# Patient Record
Sex: FEMALE | Race: WHITE | URBAN - METROPOLITAN AREA
[De-identification: names, ages, dates, MRNs, and addresses within clinical notes are randomized per-mention and may not be internally consistent; named-entity substitution may affect disease eponyms.]

---

## 2020-01-23 ENCOUNTER — OFFICE VISIT (OUTPATIENT)
Dept: ORTHOPEDIC SURGERY | Age: 40
End: 2020-01-23
Payer: COMMERCIAL

## 2020-01-23 VITALS — WEIGHT: 215 LBS | HEIGHT: 67 IN | BODY MASS INDEX: 33.74 KG/M2

## 2020-01-23 PROCEDURE — 99203 OFFICE O/P NEW LOW 30 MIN: CPT | Performed by: ORTHOPAEDIC SURGERY

## 2020-01-23 NOTE — PROGRESS NOTES
Chief Complaint    Shoulder Pain (RIGHT anterior/lateral shoulder pain with some posterior shoulder pain)      History of Present Illness:  Leonard Schneider is a 44 y.o. female presents the office today for a new problem. Patient sent in orthopedic consultation per Dr. Mikie Mcqueen. Patient is here with a chief complaint of right anterior and lateral shoulder pain. She has more mild posterior shoulder pain. Patient has had no recent injury or trauma. She does have a history of bull riding in the past.  She has tried conservative measures of ice, rest, and over-the-counter medications without significant relief. She denies any cervical pain or upper extremity radicular symptoms. Pain Assessment  Location of Pain: Shoulder  Location Modifiers: Right, Anterior, Posterior  Severity of Pain: 8  Quality of Pain: Sharp, Dull, Aching  Duration of Pain: Persistent  Frequency of Pain: Constant  Aggravating Factors: Bending, Stretching, Straightening  Limiting Behavior: Some  Relieving Factors: Rest, Nsaids    Medical History:  Past Medical History:   Diagnosis Date    Hip pain     left    Nausea & vomiting     Rhinitis     Sinus infection      There are no active problems to display for this patient.     Past Surgical History:   Procedure Laterality Date    FOOT SURGERY      rt foot foreign object removed    HAND SURGERY      rt hand and lt wrist-ganglion removed    HIP SURGERY  9-    Left hip steroid injection, arthrogram    LEG SURGERY      david legs-interal pronation as a toddler    TONSILLECTOMY       Family History   Problem Relation Age of Onset    Arthritis Mother     Depression Mother     Heart Disease Maternal Grandmother     Diabetes Maternal Grandmother     Heart Disease Maternal Grandfather      Social History     Socioeconomic History    Marital status: Single     Spouse name: None    Number of children: None    Years of education: None    Highest education level: None Occupational History    None   Social Needs    Financial resource strain: None    Food insecurity:     Worry: None     Inability: None    Transportation needs:     Medical: None     Non-medical: None   Tobacco Use    Smoking status: Former Smoker    Smokeless tobacco: Never Used    Tobacco comment: quit in 2008   Substance and Sexual Activity    Alcohol use: No    Drug use: No    Sexual activity: None   Lifestyle    Physical activity:     Days per week: None     Minutes per session: None    Stress: None   Relationships    Social connections:     Talks on phone: None     Gets together: None     Attends [employer] service: None     Active member of club or organization: None     Attends meetings of clubs or organizations: None     Relationship status: None    Intimate partner violence:     Fear of current or ex partner: None     Emotionally abused: None     Physically abused: None     Forced sexual activity: None   Other Topics Concern    None   Social History Narrative    None     Current Outpatient Medications   Medication Sig Dispense Refill    TURMERIC PO Take 1,000 mg by mouth      Flax OIL Take by mouth      vitamin D (ERGOCALCIFEROL) 16036 UNIT CAPS capsule Take 2,000 Units by mouth daily.  fish oil-omega-3 fatty acids 1000 MG capsule Take 2 g by mouth daily.  Garlic 230 MG TABS Take  by mouth.  fexofenadine (ALLEGRA ALLERGY) 180 MG tablet Take 180 mg by mouth daily.  dextromethorphan-guaifenesin (MUCINEX DM)  MG per SR tablet Take 1 tablet by mouth every 12 hours as needed.  Aspirin-Acetaminophen-Caffeine (EXCEDRIN PO) Take 1 tablet by mouth every 4 hours as needed.  calcium carbonate (OSCAL) 500 MG TABS tablet Take 1,000 mg by mouth daily. No current facility-administered medications for this visit.         Review of Systems:  Relevant review of systems reviewed and available in the patient's chart    Vital Signs:  Ht 5' 7.01\" (1.702 m)   Wt 215 lb (97.5 kg)   BMI 33.67 kg/m²     General Exam:   Constitutional: Patient is adequately groomed with no evidence of malnutrition  DTRs: Deep tendon reflexes are intact  Mental Status: The patient is oriented to time, place and person. The patient's mood and affect are appropriate. Lymphatic: The lymphatic examination bilaterally reveals all areas to be without enlargement or induration. Vascular: Examination reveals no swelling or calf tenderness. Peripheral pulses are palpable and 2+. Neurological: The patient has good coordination. There is no weakness or sensory deficit. Right shoulder Examination:    Inspection:  No rashes, scars, or lesions. No deformity or atrophy. Palpation: No tenderness at the [facility] joint. Moderate tenderness over the bicipital groove    Range of Motion: 180 degrees of forward flexion, 7 degrees of external rotation, internal rotation L2    Strength: 5/5 strength with internal and external rotation. Negative belly press sign. 3+/5 strength with abduction. Biceps and triceps strength is 5/5. Special Tests:  Positive Waters and Neer impingement exam.  Negative speed sign. Negative crossover examination. Skin: There are no rashes, ulcerations or lesions. Gait: Normal gait pattern    Reflex normal deep tendon reflexes    Additional Comments:       Additional Examinations:         Contralateral Exam: Examination of the left shoulder reveals no atrophy or deformity. Skin is warm and dry. Range of motion is within normal limits. There is no focal tenderness with palpation. No AC joint tenderness. Negative Neer and Waters-Petros exams. Strength is graded 5/5 throughout. Neck: Examination of the neck does not show any tenderness, deformity or injury. Range of motion is unremarkable. There is no gross instability. There are no rashes, ulcerations or lesions.   Strength and tone are normal.    Radiology:     X-rays obtained and reviewed in office:  Views

## 2020-02-06 ENCOUNTER — OFFICE VISIT (OUTPATIENT)
Dept: ORTHOPEDIC SURGERY | Age: 40
End: 2020-02-06
Payer: COMMERCIAL

## 2020-02-06 VITALS — WEIGHT: 214.95 LBS | HEIGHT: 67 IN | BODY MASS INDEX: 33.74 KG/M2

## 2020-02-06 PROCEDURE — 99213 OFFICE O/P EST LOW 20 MIN: CPT | Performed by: ORTHOPAEDIC SURGERY

## 2020-02-06 RX ORDER — BETAMETHASONE SODIUM PHOSPHATE AND BETAMETHASONE ACETATE 3; 3 MG/ML; MG/ML
12 INJECTION, SUSPENSION INTRA-ARTICULAR; INTRALESIONAL; INTRAMUSCULAR; SOFT TISSUE ONCE
Status: COMPLETED | OUTPATIENT
Start: 2020-02-06 | End: 2020-02-06

## 2020-02-06 RX ADMIN — BETAMETHASONE SODIUM PHOSPHATE AND BETAMETHASONE ACETATE 12 MG: 3; 3 INJECTION, SUSPENSION INTRA-ARTICULAR; INTRALESIONAL; INTRAMUSCULAR; SOFT TISSUE at 09:58

## 2020-02-06 NOTE — PROGRESS NOTES
Administrations This Visit     betamethasone acetate-betamethasone sodium phosphate (CELESTONE) injection 12 mg     Admin Date  02/06/2020  09:58 Action  Given Dose  12 mg Route  Intra-articular Site  Shoulder Right Administered By  Darrell Benites MD    Ordering Provider:  Darrell Benites MD    [address]:  [id number]    Lot#:  [id number]    :  [employer]    Patient Supplied?:  No             Bupivacaine 250mg/50mL  3 cc  Lot # [id number]  Exp  02/01/2021  NDC# 9807-6364-26

## 2020-02-06 NOTE — PROGRESS NOTES
Chief Complaint    Follow-up (TR MRI R SHOULDER)      History of Present Illness:  Mulu Zarco is a 44 y.o. female returns today for follow-up regarding her right shoulder after undergoing an MRI. Her symptoms are essentially unchanged. She continues to report pain in the shoulder with movement especially with overhead activity. The results of the MRI and case some moderate tendinosis and peritendinous bursitis of the rotator cuff without rotator cuff tear. There is also some traction related edema within the proximal humerus consistent with impingement. Pain Assessment  Location of Pain: Shoulder  Location Modifiers: Right  Severity of Pain: 3  Quality of Pain: Aching  Duration of Pain: Persistent  Frequency of Pain: Constant  Aggravating Factors: Bending, Stretching, Straightening, Exercise  Limiting Behavior: Yes  Result of Injury: No  Work-Related Injury: No  Are there other pain locations you wish to document?: No    Medical History:  Past Medical History:   Diagnosis Date    Hip pain     left    Nausea & vomiting     Rhinitis     Sinus infection      There are no active problems to display for this patient.     Past Surgical History:   Procedure Laterality Date    FOOT SURGERY      rt foot foreign object removed    HAND SURGERY      rt hand and lt wrist-ganglion removed    HIP SURGERY  9-    Left hip steroid injection, arthrogram    LEG SURGERY      david legs-interal pronation as a toddler    TONSILLECTOMY       Family History   Problem Relation Age of Onset    Arthritis Mother     Depression Mother     Heart Disease Maternal Grandmother     Diabetes Maternal Grandmother     Heart Disease Maternal Grandfather      Social History     Socioeconomic History    Marital status: Single     Spouse name: None    Number of children: None    Years of education: None    Highest education level: None   Occupational History    None   Social Needs    Financial resource strain: None    Food insecurity:     Worry: None     Inability: None    Transportation needs:     Medical: None     Non-medical: None   Tobacco Use    Smoking status: Former Smoker    Smokeless tobacco: Never Used    Tobacco comment: quit in 2008   Substance and Sexual Activity    Alcohol use: No    Drug use: No    Sexual activity: None   Lifestyle    Physical activity:     Days per week: None     Minutes per session: None    Stress: None   Relationships    Social connections:     Talks on phone: None     Gets together: None     Attends Muslim service: None     Active member of club or organization: None     Attends meetings of clubs or organizations: None     Relationship status: None    Intimate partner violence:     Fear of current or ex partner: None     Emotionally abused: None     Physically abused: None     Forced sexual activity: None   Other Topics Concern    None   Social History Narrative    None     Current Outpatient Medications   Medication Sig Dispense Refill    TURMERIC PO Take 1,000 mg by mouth      Flax OIL Take by mouth      vitamin D (ERGOCALCIFEROL) 63453 UNIT CAPS capsule Take 2,000 Units by mouth daily.  calcium carbonate (OSCAL) 500 MG TABS tablet Take 1,000 mg by mouth daily.  fish oil-omega-3 fatty acids 1000 MG capsule Take 2 g by mouth daily.  Garlic 620 MG TABS Take  by mouth.  fexofenadine (ALLEGRA ALLERGY) 180 MG tablet Take 180 mg by mouth daily.  dextromethorphan-guaifenesin (MUCINEX DM)  MG per SR tablet Take 1 tablet by mouth every 12 hours as needed.  Aspirin-Acetaminophen-Caffeine (EXCEDRIN PO) Take 1 tablet by mouth every 4 hours as needed. No current facility-administered medications for this visit.         Review of Systems:  Relevant review of systems reviewed and available in the patient's chart    Vital Signs:  Ht 5' 7.01\" (1.702 m)   Wt 214 lb 15.2 oz (97.5 kg)   BMI 33.66 kg/m²     General Exam:   Constitutional: Patient is adequately groomed with no evidence of malnutrition  DTRs: Deep tendon reflexes are intact  Mental Status: The patient is oriented to time, place and person. The patient's mood and affect are appropriate. Lymphatic: The lymphatic examination bilaterally reveals all areas to be without enlargement or induration. Vascular: Examination reveals no swelling or calf tenderness. Peripheral pulses are palpable and 2+. Neurological: The patient has good coordination. There is no weakness or sensory deficit. Right shoulder Examination:    Inspection:  No rashes, scars, or lesions. No deformity or atrophy. Palpation: No tenderness at the [facility] joint. Moderate tenderness over the bicipital groove    Range of Motion: 180 degrees of forward flexion, 7 degrees of external rotation, internal rotation L2    Strength: 5/5 strength with internal and external rotation. Negative belly press sign. 3+/5 strength with abduction. Biceps and triceps strength is 5/5. Special Tests:  Positive Waters and Neer impingement exam.  Negative speed sign. Negative crossover examination. Skin: There are no rashes, ulcerations or lesions. Gait: Normal gait pattern    Reflex normal deep tendon reflexes      Radiology:     FINDINGS:  AC arthropathy is mild.  Shallow subacromial spur.       Moderate tendinosis supraspinatus and less so infraspinatus insertions.  Mild    peritendinobursitis.  Pseudocysts and marrow reaction greater tuberosity.       Biceps long head tendon is intact.       No labrum tear.  Small effusion.  No soft tissue mass.           CONCLUSION:   1. Moderate tendinosis supraspinatus insertion. Mild peritendinobursitis.  Underlying    pseudocysts and marrow reaction related to chronic traction and/or impingement. 2. Broad shallow subacromial spur. Lateral arch narrowing. 3. Please see above. Impression:  Encounter Diagnosis   Name Primary?     Shoulder impingement syndrome, right Yes Office Procedures:  No orders of the defined types were placed in this encounter. Procedure: Right shoulder subacromial injection  I discussed in detail the risk, benefits, and complications of an injection which included but are not limited to infection, skin reactions, hot swollen joints, and anaphylaxis with the patient. The patient verbalized good understanding and gave informed consent for the right shoulder injection. The skin was prepped using sterile alcohol solution. A sterile 22-gauge needle was inserted into the subacromial space and a mixture of 2ml Beta-Beta, 3 mL of 0.5% bupivacaine, was injected under sterile technique. The needle was withdrawn and the puncture site sealed with a Band-Aid. Technique: Under sterile conditions a SonDotBlu ultrasound unit with a variable frequency (6.0-15.0 MHz) linear transducer was used to localize the placement of a 22-gauge needle into the subacromial space. Findings: Successful needle placement for  subacromial space injection. Final images were taken and saved for permanent record. The patient tolerated the injection well. The patient was instructed to call the office immediately if there is any pain, redness, warmth, fever, or chills. Treatment Plan: Today we have gone over the diagnosis and the recommendations for treatment. Reassured the patient that there is no need for any surgical intervention. I would recommend a right shoulder subacromial injection and referral to outpatient physical therapy. She will continue with oral analgesics over-the-counter.   Follow-up with us as needed

## 2020-02-10 ENCOUNTER — TELEPHONE (OUTPATIENT)
Dept: ORTHOPEDIC SURGERY | Age: 40
End: 2020-02-10

## 2020-02-10 NOTE — LETTER
[facility] [address] [address]  Phone: [phone]  Fax: [phone]      February 12, 2020     Patient: Lamont Herring   YOB: 1980       To Whom it May Concern:    Lamont Herring was seen in my clinic on 2/6/20. At this time the patient does not have any restrictions may return to full duty. If you have any questions or concerns, please don't hesitate to call.     Sincerely,

## 2020-02-11 NOTE — TELEPHONE ENCOUNTER
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 What are her restrictions and how long? 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 Can she lift patients?

## 2020-02-11 NOTE — TELEPHONE ENCOUNTER
Her MRI showed no evidence of any rotator cuff tearing, just impingement syndrome. She really has no restrictions other than allowing pain to be her guide.   Just

## 2023-01-31 ENCOUNTER — OFFICE (OUTPATIENT)
Dept: URBAN - METROPOLITAN AREA CLINIC 18 | Facility: CLINIC | Age: 43
End: 2023-01-31

## 2023-01-31 VITALS
HEART RATE: 90 BPM | SYSTOLIC BLOOD PRESSURE: 136 MMHG | HEIGHT: 67 IN | WEIGHT: 226 LBS | DIASTOLIC BLOOD PRESSURE: 84 MMHG

## 2023-01-31 DIAGNOSIS — Z87.19 PERSONAL HISTORY OF OTHER DISEASES OF THE DIGESTIVE SYSTEM: ICD-10-CM

## 2023-01-31 DIAGNOSIS — R10.30 LOWER ABDOMINAL PAIN, UNSPECIFIED: ICD-10-CM

## 2023-01-31 DIAGNOSIS — K62.5 HEMORRHAGE OF ANUS AND RECTUM: ICD-10-CM

## 2023-01-31 PROCEDURE — 99204 OFFICE O/P NEW MOD 45 MIN: CPT | Performed by: INTERNAL MEDICINE

## 2023-02-01 LAB
CBC, PLATELET CT  AND  DIFF: ABS BASOPHIL: 0.1 K/UL
CBC, PLATELET CT  AND  DIFF: ABS EOSINOPHIL: 0.2 K/UL
CBC, PLATELET CT  AND  DIFF: ABS IMMATURE GRANS: 0 K/UL
CBC, PLATELET CT  AND  DIFF: ABS LYMPHOCYTE: 1.8 K/UL
CBC, PLATELET CT  AND  DIFF: ABS MONOCYTE: 0.7 K/UL
CBC, PLATELET CT  AND  DIFF: ABS NEUTROPHIL: 4.1 K/UL
CBC, PLATELET CT  AND  DIFF: BASOPHIL: 0.9 %
CBC, PLATELET CT  AND  DIFF: DIFFERENTIAL: (no result)
CBC, PLATELET CT  AND  DIFF: EOSINOPHIL: 2.8 %
CBC, PLATELET CT  AND  DIFF: HEMATOCRIT: 43.2 %
CBC, PLATELET CT  AND  DIFF: HEMOGLOBIN: 14.6 G/DL
CBC, PLATELET CT  AND  DIFF: IMMATURE GRANULOCYTES: 0.4 %
CBC, PLATELET CT  AND  DIFF: LYMPHOCYTE: 25.7 %
CBC, PLATELET CT  AND  DIFF: MCH: 27.9 PG
CBC, PLATELET CT  AND  DIFF: MCHC: 33.8 G/DL
CBC, PLATELET CT  AND  DIFF: MCV: 82.6 FL
CBC, PLATELET CT  AND  DIFF: MONOCYTE: 10.3 %
CBC, PLATELET CT  AND  DIFF: MPV: 10.2 FL
CBC, PLATELET CT  AND  DIFF: NEUTROPHIL: 59.9 %
CBC, PLATELET CT  AND  DIFF: NRBCS: 0 /100 WBC
CBC, PLATELET CT  AND  DIFF: PLATELET COUNT: 289 K/UL
CBC, PLATELET CT  AND  DIFF: RBC: 5.23 M/UL
CBC, PLATELET CT  AND  DIFF: RDW: 12.7 %
CBC, PLATELET CT  AND  DIFF: WBC COUNT: 6.8 K/UL
CELIAC DISEASE ANTIBODY PANEL: ANTI ENDOMYSIAL IGA: NEGATIVE
CELIAC DISEASE ANTIBODY PANEL: ANTIGLIADIN IGA: <20
CELIAC DISEASE ANTIBODY PANEL: ANTIGLIADIN IGG: <20
CELIAC DISEASE ANTIBODY PANEL: IGA: 166 MG/DL
CELIAC DISEASE ANTIBODY PANEL: TISSUE TRANSGLUTAMINASE AB, IGA: <4

## 2024-02-21 ENCOUNTER — OFFICE VISIT (OUTPATIENT)
Age: 44
End: 2024-02-21

## 2024-02-21 VITALS
DIASTOLIC BLOOD PRESSURE: 84 MMHG | BODY MASS INDEX: 36.8 KG/M2 | OXYGEN SATURATION: 98 % | SYSTOLIC BLOOD PRESSURE: 124 MMHG | HEART RATE: 82 BPM | TEMPERATURE: 98.2 F | WEIGHT: 235 LBS

## 2024-02-21 DIAGNOSIS — M79.641 RIGHT HAND PAIN: Primary | ICD-10-CM

## 2024-02-21 DIAGNOSIS — S66.911A SPRAIN AND STRAIN OF RIGHT HAND: ICD-10-CM

## 2024-02-21 DIAGNOSIS — S63.91XA SPRAIN AND STRAIN OF RIGHT HAND: ICD-10-CM

## 2024-02-21 ASSESSMENT — ENCOUNTER SYMPTOMS
BACK PAIN: 0
SHORTNESS OF BREATH: 0
COUGH: 0

## 2024-02-21 NOTE — PROGRESS NOTES
Chelsie Huerta (:  1980) is a 43 y.o. female,New patient, here for evaluation of the following chief complaint(s):  Hand Injury (R hand injury/[place] a snap when picking up something heavy/Hard to squeeze/Wants a epipen)      ASSESSMENT/PLAN:  1. Right hand pain    - XR HAND RIGHT (MIN 3 VIEWS)    2. Sprain and strain of right hand    -no acute finding on XR,apply ice,take Ibuprofen as needed       No follow-ups on file.    SUBJECTIVE/OBJECTIVE:  Injury at a [employer]pt store      History provided by:  Patient  Hand Injury   The incident occurred 12 to 24 hours ago. The injury mechanism was twisted. The pain is present in the right hand. The quality of the pain is described as aching. The pain does not radiate. The pain is moderate. The pain has been Constant since the incident. Pertinent negatives include no chest pain, muscle weakness, numbness or tingling. She has tried nothing for the symptoms.       Vitals:    24 1120   BP: 124/84   Site: Right Upper Arm   Position: Sitting   Cuff Size: Large Adult   Pulse: 82   Temp: 98.2 °F (36.8 °C)   TempSrc: Oral   SpO2: 98%   Weight: 106.6 kg (235 lb)       Review of Systems   Constitutional:  Negative for activity change and appetite change.   HENT:  Negative for congestion.    Respiratory:  Negative for cough and shortness of breath.    Cardiovascular:  Negative for chest pain.   Musculoskeletal:  Negative for back pain and joint swelling.   Skin:  Negative for rash.   Neurological:  Negative for tingling and numbness.       Physical Exam  Constitutional:       General: She is not in acute distress.  HENT:      Nose: No congestion.      Mouth/Throat:      Mouth: Mucous membranes are moist.      Pharynx: No posterior oropharyngeal erythema.   Eyes:      Conjunctiva/sclera: Conjunctivae normal.      Pupils: Pupils are equal, round, and reactive to light.   Cardiovascular:      Rate and Rhythm: Normal rate.   Pulmonary:      Effort: Pulmonary effort is normal. No

## 2025-03-18 ENCOUNTER — OFFICE (OUTPATIENT)
Dept: URBAN - METROPOLITAN AREA CLINIC 18 | Age: 45
End: 2025-03-18
Payer: COMMERCIAL

## 2025-03-18 VITALS
OXYGEN SATURATION: 100 % | HEART RATE: 82 BPM | WEIGHT: 246 LBS | DIASTOLIC BLOOD PRESSURE: 82 MMHG | SYSTOLIC BLOOD PRESSURE: 128 MMHG | HEIGHT: 67 IN

## 2025-03-18 DIAGNOSIS — R12 HEARTBURN: ICD-10-CM

## 2025-03-18 DIAGNOSIS — Z12.11 ENCOUNTER FOR SCREENING FOR MALIGNANT NEOPLASM OF COLON: ICD-10-CM

## 2025-03-18 DIAGNOSIS — R19.7 DIARRHEA, UNSPECIFIED: ICD-10-CM

## 2025-03-18 DIAGNOSIS — R13.10 DYSPHAGIA, UNSPECIFIED: ICD-10-CM

## 2025-03-18 DIAGNOSIS — E66.9 OBESITY, UNSPECIFIED: ICD-10-CM

## 2025-03-18 PROCEDURE — 99214 OFFICE O/P EST MOD 30 MIN: CPT | Performed by: INTERNAL MEDICINE

## 2025-04-01 ENCOUNTER — OFFICE (OUTPATIENT)
Dept: URBAN - METROPOLITAN AREA CLINIC 18 | Age: 45
End: 2025-04-01
Payer: COMMERCIAL

## 2025-04-01 VITALS — HEIGHT: 67 IN

## 2025-04-01 DIAGNOSIS — K76.0 FATTY (CHANGE OF) LIVER, NOT ELSEWHERE CLASSIFIED: ICD-10-CM

## 2025-04-01 PROCEDURE — 76981 USE PARENCHYMA: CPT | Performed by: INTERNAL MEDICINE

## 2025-04-14 ENCOUNTER — AMBULATORY SURGICAL CENTER (OUTPATIENT)
Dept: URBAN - METROPOLITAN AREA SURGERY 5 | Facility: SURGERY | Age: 45
End: 2025-04-14
Payer: COMMERCIAL

## 2025-04-14 VITALS
OXYGEN SATURATION: 97 % | RESPIRATION RATE: 15 BRPM | SYSTOLIC BLOOD PRESSURE: 122 MMHG | HEART RATE: 89 BPM | RESPIRATION RATE: 19 BRPM | DIASTOLIC BLOOD PRESSURE: 70 MMHG | HEART RATE: 85 BPM | HEART RATE: 98 BPM | SYSTOLIC BLOOD PRESSURE: 154 MMHG | DIASTOLIC BLOOD PRESSURE: 73 MMHG | SYSTOLIC BLOOD PRESSURE: 109 MMHG | DIASTOLIC BLOOD PRESSURE: 92 MMHG | HEART RATE: 84 BPM | HEART RATE: 79 BPM | DIASTOLIC BLOOD PRESSURE: 84 MMHG | DIASTOLIC BLOOD PRESSURE: 95 MMHG | OXYGEN SATURATION: 96 % | SYSTOLIC BLOOD PRESSURE: 130 MMHG | RESPIRATION RATE: 16 BRPM | DIASTOLIC BLOOD PRESSURE: 89 MMHG | OXYGEN SATURATION: 98 % | DIASTOLIC BLOOD PRESSURE: 66 MMHG | SYSTOLIC BLOOD PRESSURE: 100 MMHG | RESPIRATION RATE: 21 BRPM | DIASTOLIC BLOOD PRESSURE: 71 MMHG | SYSTOLIC BLOOD PRESSURE: 119 MMHG | SYSTOLIC BLOOD PRESSURE: 94 MMHG | SYSTOLIC BLOOD PRESSURE: 108 MMHG | DIASTOLIC BLOOD PRESSURE: 74 MMHG | HEART RATE: 110 BPM | DIASTOLIC BLOOD PRESSURE: 72 MMHG | HEART RATE: 81 BPM | HEIGHT: 67 IN | OXYGEN SATURATION: 100 % | HEART RATE: 87 BPM | RESPIRATION RATE: 18 BRPM | SYSTOLIC BLOOD PRESSURE: 123 MMHG | RESPIRATION RATE: 26 BRPM | WEIGHT: 244 LBS | SYSTOLIC BLOOD PRESSURE: 110 MMHG

## 2025-04-14 DIAGNOSIS — K62.5 HEMORRHAGE OF ANUS AND RECTUM: ICD-10-CM

## 2025-04-14 DIAGNOSIS — R13.10 DYSPHAGIA, UNSPECIFIED: ICD-10-CM

## 2025-04-14 DIAGNOSIS — R12 HEARTBURN: ICD-10-CM

## 2025-04-14 DIAGNOSIS — Z12.11 ENCOUNTER FOR SCREENING FOR MALIGNANT NEOPLASM OF COLON: ICD-10-CM

## 2025-04-14 DIAGNOSIS — R19.7 DIARRHEA, UNSPECIFIED: ICD-10-CM

## 2025-04-14 PROCEDURE — 45380 COLONOSCOPY AND BIOPSY: CPT | Performed by: INTERNAL MEDICINE

## 2025-04-14 PROCEDURE — 43450 DILATE ESOPHAGUS 1/MULT PASS: CPT | Performed by: INTERNAL MEDICINE

## 2025-04-14 PROCEDURE — 43239 EGD BIOPSY SINGLE/MULTIPLE: CPT | Mod: 51 | Performed by: INTERNAL MEDICINE

## 2025-04-14 RX ORDER — COLESTIPOL HYDROCHLORIDE 1 G/1
TABLET ORAL
Qty: 180 | Refills: 3 | Status: ACTIVE
Start: 2025-04-14

## 2025-04-18 LAB — PDF: PDF REPORT: (no result)
